# Patient Record
Sex: MALE | URBAN - METROPOLITAN AREA
[De-identification: names, ages, dates, MRNs, and addresses within clinical notes are randomized per-mention and may not be internally consistent; named-entity substitution may affect disease eponyms.]

---

## 2017-10-24 ENCOUNTER — EMERGENCY (EMERGENCY)
Facility: HOSPITAL | Age: 52
LOS: 1 days | Discharge: HOME | End: 2017-10-24
Attending: EMERGENCY MEDICINE | Admitting: EMERGENCY MEDICINE
Payer: MEDICAID

## 2017-10-24 VITALS
RESPIRATION RATE: 20 BRPM | OXYGEN SATURATION: 100 % | TEMPERATURE: 98 F | DIASTOLIC BLOOD PRESSURE: 82 MMHG | HEART RATE: 83 BPM | SYSTOLIC BLOOD PRESSURE: 120 MMHG

## 2017-10-24 VITALS
OXYGEN SATURATION: 100 % | HEART RATE: 78 BPM | RESPIRATION RATE: 16 BRPM | DIASTOLIC BLOOD PRESSURE: 87 MMHG | SYSTOLIC BLOOD PRESSURE: 132 MMHG

## 2017-10-24 PROCEDURE — 99284 EMERGENCY DEPT VISIT MOD MDM: CPT

## 2017-10-24 RX ORDER — DIAZEPAM 5 MG
1 TABLET ORAL
Qty: 3 | Refills: 0 | OUTPATIENT
Start: 2017-10-24 | End: 2017-10-27

## 2017-10-24 RX ORDER — LIDOCAINE 4 G/100G
1 CREAM TOPICAL ONCE
Qty: 0 | Refills: 0 | Status: COMPLETED | OUTPATIENT
Start: 2017-10-24 | End: 2017-10-24

## 2017-10-24 RX ORDER — ACETAMINOPHEN 500 MG
975 TABLET ORAL ONCE
Qty: 0 | Refills: 0 | Status: COMPLETED | OUTPATIENT
Start: 2017-10-24 | End: 2017-10-24

## 2017-10-24 RX ORDER — IBUPROFEN 200 MG
600 TABLET ORAL ONCE
Qty: 0 | Refills: 0 | Status: COMPLETED | OUTPATIENT
Start: 2017-10-24 | End: 2017-10-24

## 2017-10-24 RX ORDER — DICLOFENAC SODIUM 30 MG/G
1 GEL TOPICAL
Qty: 1 | Refills: 0 | OUTPATIENT
Start: 2017-10-24 | End: 2017-11-07

## 2017-10-24 RX ADMIN — Medication 600 MILLIGRAM(S): at 16:22

## 2017-10-24 RX ADMIN — Medication 975 MILLIGRAM(S): at 16:22

## 2017-10-24 RX ADMIN — LIDOCAINE 1 PATCH: 4 CREAM TOPICAL at 16:28

## 2017-10-24 RX ADMIN — Medication 600 MILLIGRAM(S): at 16:28

## 2017-10-24 RX ADMIN — LIDOCAINE 1 PATCH: 4 CREAM TOPICAL at 16:22

## 2017-10-24 NOTE — ED PROVIDER NOTE - NEUROLOGICAL, MLM
Alert and oriented, no focal deficits, no motor or sensory deficits. neg straight leg raise, non-ataxic gait

## 2017-10-24 NOTE — ED PROVIDER NOTE - MEDICAL DECISION MAKING DETAILS
52 yr old male with remote hx of smoking presents to ed c/o left lumbar back pain on and off gradually worsening past 1-2 days.  UBER  ~10hrs a day. no fever, no chills, no visual changes, no headache, no numbness or tingling, no sob, no cough, no cp, no palpitations, no leg swelling, no syncope, no abd pain, no n/v/d, no dysuria, no rashes. no weigh loss or night sweats. ambulatory., no trauma    pt with msk strain. no red flags. tylenol, motrin and lidoderm.

## 2017-10-24 NOTE — ED PROVIDER NOTE - OBJECTIVE STATEMENT
52 yr old male with remote hx of smoking presents to ed c/o left lumbar back pain on and off gradually worsening past 1-2 days.  UBER  ~10hrs a day. no fever, no chills, no visual changes, no headache, no numbness or tingling, no sob, no cough, no cp, no palpitations, no leg swelling, no syncope, no abd pain, no n/v/d, no dysuria, no rashes. no weigh loss or night sweats. ambulatory., no trauma

## 2017-10-24 NOTE — ED PROVIDER NOTE - MUSCULOSKELETAL, MLM
Spine appears normal, range of motion is not limited, no muscle or joint tenderness. 5/5 strength on all 4 extremity.

## 2017-10-24 NOTE — ED ADULT TRIAGE NOTE - CHIEF COMPLAINT QUOTE
Pt  c/o b/l  flank pain since yesterday pain worse to left side. Pt became worse today. Pt afebrile and denies any urinary problems.

## 2017-10-24 NOTE — ED ADULT NURSE NOTE - OBJECTIVE STATEMENT
52 year old male presents to the ER with B/L upper back pain worse on the left side x2 days denies fever, any urinary problems or any other concerns

## 2017-10-24 NOTE — ED PROVIDER NOTE - PROGRESS NOTE DETAILS
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  no red flags.    Dx lumbar strain. motrin/tylenol, heat packs, valium at bedtime, no heavy lifting, frequent stretches and obtain lumbar support.  left ambulatory.  return precautions given.